# Patient Record
Sex: FEMALE | Race: OTHER | URBAN - METROPOLITAN AREA
[De-identification: names, ages, dates, MRNs, and addresses within clinical notes are randomized per-mention and may not be internally consistent; named-entity substitution may affect disease eponyms.]

---

## 2018-10-25 ENCOUNTER — EMERGENCY (EMERGENCY)
Facility: HOSPITAL | Age: 18
LOS: 1 days | Discharge: HOME | End: 2018-10-25
Attending: EMERGENCY MEDICINE
Payer: SELF-PAY

## 2018-10-25 VITALS — WEIGHT: 134.92 LBS | HEIGHT: 63 IN

## 2018-10-25 VITALS
TEMPERATURE: 98 F | OXYGEN SATURATION: 100 % | DIASTOLIC BLOOD PRESSURE: 77 MMHG | SYSTOLIC BLOOD PRESSURE: 111 MMHG | HEART RATE: 67 BPM | RESPIRATION RATE: 17 BRPM

## 2018-10-25 PROCEDURE — 99283 EMERGENCY DEPT VISIT LOW MDM: CPT

## 2018-10-25 RX ORDER — CEPHALEXIN 500 MG
500 CAPSULE ORAL ONCE
Qty: 0 | Refills: 0 | Status: COMPLETED | OUTPATIENT
Start: 2018-10-25 | End: 2018-10-25

## 2018-10-25 RX ORDER — CEPHALEXIN 500 MG
1 CAPSULE ORAL
Qty: 28 | Refills: 0 | OUTPATIENT
Start: 2018-10-25 | End: 2018-10-31

## 2018-10-25 RX ADMIN — Medication 500 MILLIGRAM(S): at 19:45

## 2018-10-25 NOTE — ED PROVIDER NOTE - OBJECTIVE STATEMENT
PT is a 18y F with no PMH complaining of stye to R lower eyelid x 4 months. She states she does not have insurance and has not been able to see a dr. She has a family friend who prescribed her tobramycin and dexamethasone drops which she has been using for about 1 week. She has also been applying warm compresses. She denies any other symptoms. No fever/nausea/vomiting/drainage. NKDA. She denies smoking/drinking/drugs. No other complaints.

## 2018-10-25 NOTE — ED ADULT TRIAGE NOTE - CHIEF COMPLAINT QUOTE
pt with stye to right lower lid x 3 months. not responding to heat or eye drops. pt here to have it evaluated

## 2018-10-25 NOTE — ED PROVIDER NOTE - ATTENDING CONTRIBUTION TO CARE
seen with acp  here for stye over right lower eyelid  for the last 4 months  PE stye noted   will start on oral antibiotics  Agree with acps assessment hx and physical

## 2018-10-25 NOTE — ED PROVIDER NOTE - CHPI ED SYMPTOMS NEG
no blurred vision/no double vision/no foreign body/no discharge/no itching/no photophobia/no purulent drainage/no drainage

## 2024-04-18 ENCOUNTER — APPOINTMENT (OUTPATIENT)
Age: 24
End: 2024-04-18
Payer: COMMERCIAL

## 2024-04-18 VITALS
BODY MASS INDEX: 26.31 KG/M2 | DIASTOLIC BLOOD PRESSURE: 69 MMHG | HEIGHT: 62 IN | HEART RATE: 67 BPM | OXYGEN SATURATION: 100 % | SYSTOLIC BLOOD PRESSURE: 115 MMHG | WEIGHT: 143 LBS

## 2024-04-18 DIAGNOSIS — M25.361 OTHER INSTABILITY, RIGHT KNEE: ICD-10-CM

## 2024-04-18 DIAGNOSIS — M25.061 HEMARTHROSIS, RIGHT KNEE: ICD-10-CM

## 2024-04-18 DIAGNOSIS — M23.91 UNSPECIFIED INTERNAL DERANGEMENT OF RIGHT KNEE: ICD-10-CM

## 2024-04-18 PROBLEM — Z00.00 ENCOUNTER FOR PREVENTIVE HEALTH EXAMINATION: Status: ACTIVE | Noted: 2024-04-18

## 2024-04-18 PROCEDURE — ZZZZZ: CPT

## 2024-04-18 PROCEDURE — 99203 OFFICE O/P NEW LOW 30 MIN: CPT | Mod: 25

## 2024-04-18 PROCEDURE — 20611 DRAIN/INJ JOINT/BURSA W/US: CPT | Mod: RT

## 2024-04-18 NOTE — PHYSICAL EXAM
[de-identified] : Knee (right)   Inspection  Skin: normal  Effusion: Moderate Bursa swelling: none   Palpation  Tenderness: Moderate-severe Location: Medial patellar facet, MPFL, less so medial joint line  Crepitus: none.  Defect: none.  Popliteal fullness: negative.   Flexion  Active ROM: Limited to 90 degrees Passive ROM: Limited to 90 degrees   Extension  Limited by 10 degrees    Straight Leg Raise- can perform  Motor strength  Flexion: 5/5  Extension: 5/5   Sensory index  Normal.   ACL/PCL tests  Lachman test: Equivocal Anterior drawer: Equivocal Posterior drawer: stable   MCL/LCL tests  MCL laxity: stable  LCL laxity: stable   Patellofemoral tests  Patellar grind test: negative  Patellar apprehension: Positive  Meniscal tests  Faby's test: Mildly positive medially Thessalys: Normal  [de-identified] : Patient had an x-ray done at [facility] which she was told was negative for any fracture or dislocation however she does not have images or report with her.

## 2024-04-18 NOTE — HISTORY OF PRESENT ILLNESS
[de-identified] : YONG BROWNE is a 23 year old female presenting with acute right-sided knee pain after playing pickup soccer on Sunday.  She states she went up for a header and the goalie collided with her knee and she fell down hard and believes she may have twisted her knee.  She said it was difficult to stand up and had difficulty walking home that day was not able to continue playing.  She says there is immediate swelling in the knee and she has been getting sharp pain every once in a while with ambulation.  She has a knee sleeve on and has made some improvement since Sunday but still with limitations and pain.  She is here today for further evaluation.  Denies any catching or locking of the knee but does endorse some buckling due to pain at times.  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

## 2024-04-18 NOTE — DISCUSSION/SUMMARY
[de-identified] : YONG BROWNE is a 23 year old female presenting with acute right knee pain after an injury in rec league soccer potentially due to patellar dislocation and spontaneous relocation.  Patient with most significant tenderness over the MPFL medial patellar facet as well as positive patellar apprehension with some mild bruising over the area and significant knee swelling.  Hemarthrosis was drained to 30 cc which likely indicates there was either a patellar dislocation or ACL injury.  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  Overall recommend the followin.  Stat MRI ordered to evaluate for potential sequelae of patellar dislocation versus ACL injury and/or medial meniscus injury 2.  30 cc bloody aspirate drained today as above 3.  Discussed for patient to work on isometric strengthening and quad sets to work on extension range of motion 4.  She will continue to wear her patellar knee sleeve.  Will call with MRI results.

## 2024-04-18 NOTE — PROCEDURE
[de-identified] : Ultrasound Guided Injection   Indication: Ensure placement within the intra-articular knee joint utilizing the [employer] portable ultrasound machine, the Linear 25mm 15-4 MHz transducer, sterile ultrasound gel, ultrasound guidance with the probe in short axis to the joint , utilizing an in-plane approach, was used for the following injection:    Injection: RIGHT intra-articular knee joint aspiration.  Indication: Acute effusion, pain.  A discussion was had with the patient regarding this procedure and all questions were answered. All risks, benefits and alternatives were discussed. These included but were not limited to bleeding, infection, and allergic reaction. A timeout was performed prior to the procedure to ensure proper side.  Chlorhexidine was used to sterilize the skin overlying the knee joint.  A 25-gauge needle was used to inject 2cc of 0.5% Ropivacaine and 2cc 1% Lidocaine, locally and into the joint from a superolateral approach.  An 18-gauge needle was used to aspirate fluid which resulted in: 30 cc bloody aspirate.  A sterile bandage was then applied. The patient tolerated the procedure well and there were no complications.

## 2024-04-24 ENCOUNTER — NON-APPOINTMENT (OUTPATIENT)
Age: 24
End: 2024-04-24

## 2024-04-25 ENCOUNTER — APPOINTMENT (OUTPATIENT)
Dept: MRI IMAGING | Facility: CLINIC | Age: 24
End: 2024-04-25
Payer: COMMERCIAL

## 2024-04-25 PROCEDURE — 73721 MRI JNT OF LWR EXTRE W/O DYE: CPT | Mod: RT

## 2024-05-15 ENCOUNTER — APPOINTMENT (OUTPATIENT)
Age: 24
End: 2024-05-15

## 2024-08-15 ENCOUNTER — APPOINTMENT (OUTPATIENT)
Age: 24
End: 2024-08-15
Payer: COMMERCIAL

## 2024-08-15 VITALS
BODY MASS INDEX: 26.68 KG/M2 | OXYGEN SATURATION: 97 % | HEART RATE: 64 BPM | WEIGHT: 145 LBS | HEIGHT: 62 IN | DIASTOLIC BLOOD PRESSURE: 79 MMHG | SYSTOLIC BLOOD PRESSURE: 123 MMHG

## 2024-08-15 DIAGNOSIS — S83.006S: ICD-10-CM

## 2024-08-15 PROCEDURE — 99204 OFFICE O/P NEW MOD 45 MIN: CPT

## 2024-08-15 PROCEDURE — 99213 OFFICE O/P EST LOW 20 MIN: CPT

## 2024-08-15 PROCEDURE — 99214 OFFICE O/P EST MOD 30 MIN: CPT

## 2024-08-15 NOTE — DISCUSSION/SUMMARY
[de-identified] : YONG BROWNE is a 23 year old female presenting with acute right knee pain after an injury in rec league soccer potentially due to patellar dislocation and spontaneous relocation.  Patient with most significant tenderness over the MPFL medial patellar facet as well as positive patellar apprehension with some mild bruising over the area and significant knee swelling.  Hemarthrosis was drained to 30 cc which likely indicates there was either a patellar dislocation or ACL injury.  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    Discussed MRI results that showed transient patellar dislocation.  Patient with delayed follow-up due to busy schedule starting a new job as a .  Overall improved but still having feelings of instability pain and weakness.  Overall recommend the followin.  Referral given to physical therapy 2.  Home exercise program provided 3.  Lateral J hinged knee brace provided to be worn over the next 2 to 4 weeks as needed at work 4.  Patient will follow-up in 2 to 3 months as needed
no

## 2024-08-15 NOTE — HISTORY OF PRESENT ILLNESS
[de-identified] : YONG BROWNE is a 23 year old female presenting with acute right-sided knee pain after playing pickup soccer on Sunday.  She states she went up for a header and the goalie collided with her knee and she fell down hard and believes she may have twisted her knee.  She said it was difficult to stand up and had difficulty walking home that day was not able to continue playing.  She says there is immediate swelling in the knee and she has been getting sharp pain every once in a while with ambulation.  She has a knee sleeve on and has made some improvement since Sunday but still with limitations and pain.  She is here today for further evaluation.  Denies any catching or locking of the knee but does endorse some buckling due to pain at times.  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  Interval history: Patient states she was unable to follow-up due to her busy schedule starting her new job as a .  She said she overall has felt much better in terms of the knee however still has feelings of instability and sharp pain as well as difficulty going up and down stairs.  She says she is very hesitant when she has to grapple or do any training where her knee might get twisted and she is afraid that she may reinjure it on the job.

## 2024-08-15 NOTE — PHYSICAL EXAM
[de-identified] : Knee (right)   Inspection  Skin: normal  Effusion: Mild Bursa swelling: none   Palpation  Tenderness: Very mild Location: Medial patellar facet, MPFL, less so medial joint line  Crepitus: none.  Defect: none.  Popliteal fullness: negative.   Flexion  Active ROM: Full Passive ROM full  Extension  Normal    Straight Leg Raise- can perform  Motor strength  Flexion: 5/5  Extension: 5/5   Sensory index  Normal.   ACL/PCL tests  Lachman test: Stable Anterior drawer: Stable Posterior drawer: stable   MCL/LCL tests  MCL laxity: stable  LCL laxity: stable   Patellofemoral tests  Patellar grind test: negative  Patellar apprehension: Mildly positive  Meniscal tests  Faby's test: Normal Thessalys: Normal  [de-identified] : MRI of right knee Date: 4/25/2024  Performed at [facility]: Yes Impression:   There are contusions involving the anterior aspect of the lateral femoral condyle as well as the medial patellar facet, in keeping with sequelae of recent transient lateral patellar dislocation. There is only minimal partial thickness fissuring of cartilage overlying the lateral patellar facet without significant patellofemoral cartilage defect. Note is made of partial tearing of the medial patellofemoral ligament at the patellar attachment. Of note, the tibial tuberosity to trochlear groove distance is borderline elevated at 19 mm.  The anterior cruciate ligament and medial/lateral menisci are intact.  These images were personally reviewed with original findings documented as above.

## 2024-09-13 ENCOUNTER — TRANSCRIPTION ENCOUNTER (OUTPATIENT)
Age: 24
End: 2024-09-13